# Patient Record
Sex: MALE | Race: BLACK OR AFRICAN AMERICAN
[De-identification: names, ages, dates, MRNs, and addresses within clinical notes are randomized per-mention and may not be internally consistent; named-entity substitution may affect disease eponyms.]

---

## 2021-02-13 ENCOUNTER — HOSPITAL ENCOUNTER (EMERGENCY)
Dept: HOSPITAL 61 - ER | Age: 36
Discharge: HOME | End: 2021-02-13
Payer: SELF-PAY

## 2021-02-13 VITALS — DIASTOLIC BLOOD PRESSURE: 60 MMHG | SYSTOLIC BLOOD PRESSURE: 104 MMHG

## 2021-02-13 VITALS — BODY MASS INDEX: 18.65 KG/M2 | WEIGHT: 130.29 LBS | HEIGHT: 70 IN

## 2021-02-13 DIAGNOSIS — R53.1: Primary | ICD-10-CM

## 2021-02-13 DIAGNOSIS — Z59.0: ICD-10-CM

## 2021-02-13 DIAGNOSIS — F17.200: ICD-10-CM

## 2021-02-13 DIAGNOSIS — F15.10: ICD-10-CM

## 2021-02-13 PROCEDURE — 99283 EMERGENCY DEPT VISIT LOW MDM: CPT

## 2021-02-13 SDOH — ECONOMIC STABILITY - HOUSING INSECURITY: HOMELESSNESS: Z59.0

## 2021-02-13 NOTE — PHYS DOC
Past Medical History


Past Medical History:  Other


Additional Past Medical Histor:  PT IS POOR HISTORIAN


Past Surgical History:  No Surgical History


Additional Past Surgical Histo:  PT IS POOR HISTORIAN


Smoking Status:  Current Every Day Smoker


Alcohol Use:  Occasionally


Social History Narrative:  [employer]





General Adult


EDM:


Chief Complaint:  DRUG ABUSE





HPI:


HPI:





Patient is a 35  year old  who was just released from  [facility] 





 Mental health clinic


[address]  In [facility]


[phone]





this morning.  He is homeless.  He was found wandering around in the street in 

the cold. He has good winter coat on.  Someone then called EMS to take him to 

the hospital.  Patient has a history of methamphetamine abuse.  He denies 

suicidal ideation, denies was ideation.  EMS said he did not want to talk much 

about what is going on with him.  





When this physician evaluate him in the room, patient was awake alert, he was 

cooperative.  He said he was hungry, cold, wants something to eat drink and a 

blanket.  Patient denies suicidal ideation, denies homicidal ideation.





Review of Systems:


Review of Systems:


Constitutional:   Denies fever or chills. []


Eyes:   Denies change in visual acuity. []


HENT:   Denies nasal congestion or sore throat. [] 


Respiratory:   Denies cough or shortness of breath. [] 


Cardiovascular:   Denies chest pain or edema. [] 


GI:   Denies abdominal pain, nausea, vomiting, bloody stools or diarrhea. [] 


:  Denies dysuria. [] 


Musculoskeletal:   Denies back pain or joint pain. [] 


Integument:   Denies rash. [] 


Neurologic:   Denies headache, focal weakness or sensory changes. [] 


Endocrine:   Denies polyuria or polydipsia. [] 


Lymphatic:  Denies swollen glands. [] 


Psychiatric:  Denies depression or anxiety. []





Heart Score:


Risk Factors:


Risk Factors:  DM, Current or recent (<one month) smoker, HTN, HLP, family 

history of CAD, obesity.


Risk Scores:


Score 0 - 3:  2.5% MACE over next 6 weeks - Discharge Home


Score 4 - 6:  20.3% MACE over next 6 weeks - Admit for Clinical Observation


Score 7 - 10:  72.7% MACE over next 6 weeks - Early Invasive Strategies





Allergies:


Allergies:





Allergies








Coded Allergies Type Severity Reaction Last Updated Verified


 


  No Known Drug Allergies    2/13/21 No











Physical Exam:


PE:





Constitutional: Well developed, well nourished, no acute distress, non-toxic 

appearance. []


HENT: Normocephalic, atraumatic, bilateral external ears normal, oropharynx 

moist, no oral exudates, nose normal. []


Eyes: PERRLA, EOMI, conjunctiva normal, no discharge. [] 


Neck: Normal range of motion, no tenderness, supple, no stridor. [] 


Cardiovascular:Heart rate regular rhythm, no murmur []


Lungs & Thorax:  Bilateral breath sounds clear to auscultation []


Abdomen: Bowel sounds normal, soft, no tenderness, no masses, no pulsatile 

masses. [] 


Skin: Warm, dry, no erythema, no rash. [] 


Back: No tenderness, no CVA tenderness. [] 


Extremities: No tenderness, no cyanosis, no clubbing, ROM intact, no edema. [] 


Neurologic: Alert and oriented X 3, normal motor function, normal sensory 

function, no focal deficits noted. []


Psychologic: Affect normal, judgement normal, mood normal. HE DENIED SUICIDAL 

IDEATION, HE SAID HE WAS COLD, ASKED POLITELY FOR A BLANKET.





Current Patient Data:


Vital Signs:





                                   Vital Signs








  Date Time  Temp Pulse Resp B/P (MAP) Pulse Ox O2 Delivery O2 Flow Rate FiO2


 


2/13/21 07:38  89 16 120/58 (78) 97 Room Air  


 


2/13/21 06:19 97.8       





 97.8       











EKG:


EKG:


[]





Radiology/Procedures:


Radiology/Procedures:


[]





Course & Med Decision Making:


Course & Med Decision Making


Pertinent Labs and Imaging studies reviewed. (See chart for details)





Patient is a 35-year-old homeless person who was wandering outside in the cold, 

EMS brought him here for evaluation.  Patient has no medical or mental problem 

at this time.  Patient will be discharged home after he was observed in the ER 

for 4 hours.





Dragon Disclaimer:


Dragon Disclaimer:


This electronic medical record was generated, in whole or in part, using a voice

 recognition dictation system.





Departure


Departure


Impression:  


   Primary Impression:  


   Homeless single person


   Additional Impression:  


   Weakness


Disposition:  01 DC HOME SELF CARE/HOMELESS


Referrals:  


NO PCP (PCP)


Patient Instructions:  Weakness





Additional Instructions:  


[facility]


[address] [place]  [address]


[phone]





[facility]


[address]


[place]  [address]


[phone]





[facility]


[address].


[place]  [address]


[phone]





[facility]


[address]


[place]  [address]


[phone]





[facility]


[address] [place]  [address]


[phone]





[facility]


[address] [place]  [address]


[phone]





[facility]


[address]


[place]  [address]


[phone]





[facility]


[address]


[place]  [address]


[phone]





[facility] 


[address]


[place]  [address]


[phone]





[facility]


[address] [place]  [address]


[phone]











CABRERA MAYO DO                Feb 13, 2021 08:38